# Patient Record
Sex: FEMALE | Race: WHITE | HISPANIC OR LATINO | Employment: OTHER | URBAN - METROPOLITAN AREA
[De-identification: names, ages, dates, MRNs, and addresses within clinical notes are randomized per-mention and may not be internally consistent; named-entity substitution may affect disease eponyms.]

---

## 2017-03-06 ENCOUNTER — OFFICE VISIT (OUTPATIENT)
Dept: CARDIOLOGY | Facility: MEDICAL CENTER | Age: 47
End: 2017-03-06
Payer: COMMERCIAL

## 2017-03-06 VITALS
HEART RATE: 94 BPM | OXYGEN SATURATION: 98 % | WEIGHT: 246 LBS | HEIGHT: 69 IN | DIASTOLIC BLOOD PRESSURE: 82 MMHG | BODY MASS INDEX: 36.43 KG/M2 | SYSTOLIC BLOOD PRESSURE: 132 MMHG

## 2017-03-06 DIAGNOSIS — R06.09 DOE (DYSPNEA ON EXERTION): ICD-10-CM

## 2017-03-06 DIAGNOSIS — R00.0 HEART RATE FAST: ICD-10-CM

## 2017-03-06 DIAGNOSIS — R73.9 HYPERGLYCEMIA: ICD-10-CM

## 2017-03-06 DIAGNOSIS — I25.2 OLD MI (MYOCARDIAL INFARCTION): ICD-10-CM

## 2017-03-06 DIAGNOSIS — E66.9 OBESITY (BMI 35.0-39.9 WITHOUT COMORBIDITY): ICD-10-CM

## 2017-03-06 LAB — EKG IMPRESSION: NORMAL

## 2017-03-06 PROCEDURE — 93000 ELECTROCARDIOGRAM COMPLETE: CPT | Performed by: INTERNAL MEDICINE

## 2017-03-06 PROCEDURE — 99204 OFFICE O/P NEW MOD 45 MIN: CPT | Performed by: INTERNAL MEDICINE

## 2017-03-06 NOTE — PROGRESS NOTES
Cardiology Consult Note:    Alex King  Date & Time note created:    3/6/2017   2:21 PM       Patient ID:  Name:             Jyoti Gutierrez   YOB: 1970  Age:                 46 y.o.  female   MRN:               [id number]                                                             Chief Complaint: No chief complaint on file.            History of Present Illness:   Jyoti Gutierrez has recent chest tightness with fast heart rate with dyspnea for the last month; She has a MI in 12/7/2016; . Lasts briefly provoked and at rest; Decreased Energy and stamina    Review of Systems:     Constitutional: Denies fevers, Denies weight changes  Eyes: Denies changes in vision, no eye pain  Ears/Nose/Throat/Mouth: Denies nasal congestion or sore throat   Cardiovascular: Denies chest pain or palpitations   Respiratory: Denies shortness of breath , Denies cough  Gastrointestinal/Hepatic: Denies abdominal pain, nausea, vomiting, diarrhea, constipation or GI bleeding   Genitourinary: Denies bladder dysfunction, dysuria or frequency  Musculoskeletal/Rheum: Denies  joint pain and swelling   Skin/Breast: Denies rash, denies breast lumps or discharge  Neurological: Denies headache, confusion, memory loss or focal weakness/parasthesias  Psychiatric: denies mood disorder   Endocrine: denies hx of diabetes but thyroid dysfunction  Heme/Oncology/Lymph Nodes: Denies enlarged lymph nodes, denies bruising or known bleeding disorder  Allergic/Immunologic: Denies hx of allergies      All other systems were reviewed and are negative (AMA/CMS criteria)    Past Medical History:   Past Medical History   Diagnosis Date   • Hypertension          Past Surgical History:  History reviewed. No pertinent past surgical history.    Hospital Medications:    Current outpatient prescriptions:   •  LISINOPRIL-HYDROCHLOROTHIAZIDE PO, Take  by mouth., Disp: , Rfl:   •  hydrocodone-acetaminophen (NORCO) 7.5-325 MG per tablet, Take 1-2 Tabs by  mouth every 6 hours as needed., Disp: 20 Tab, Rfl: 0  •  cyclobenzaprine (FLEXERIL) 10 MG Tab, Take 1 Tab by mouth 3 times a day as needed for Muscle Spasms., Disp: 20 Tab, Rfl: 0  •  azithromycin (ZITHROMAX) 250 MG Tab, 2 tabs by mouth day 1, 1 tab by mouth days 2-5, Disp: 6 Tab, Rfl: 0  •  hydrocodone-acetaminophen (NORCO) 5-325 MG Tab per tablet, Take 1 Tab by mouth every 6 hours as needed., Disp: 20 Tab, Rfl: 0    Current Outpatient Medications:  Current Outpatient Prescriptions   Medication Sig Dispense Refill   • LISINOPRIL-HYDROCHLOROTHIAZIDE PO Take  by mouth.     • hydrocodone-acetaminophen (NORCO) 7.5-325 MG per tablet Take 1-2 Tabs by mouth every 6 hours as needed. 20 Tab 0   • cyclobenzaprine (FLEXERIL) 10 MG Tab Take 1 Tab by mouth 3 times a day as needed for Muscle Spasms. 20 Tab 0   • azithromycin (ZITHROMAX) 250 MG Tab 2 tabs by mouth day 1, 1 tab by mouth days 2-5 6 Tab 0   • hydrocodone-acetaminophen (NORCO) 5-325 MG Tab per tablet Take 1 Tab by mouth every 6 hours as needed. 20 Tab 0     No current facility-administered medications for this visit.         Medication Allergy/Sensitivities:  Allergies   Allergen Reactions   • Penicillins        Family History:  Family History   Problem Relation Age of Onset   • Stroke Maternal Grandfather    • Heart Disease Paternal Grandmother    • Stroke Paternal Grandfather        Social History:  Social History     Social History   • Marital Status:      Spouse Name: N/A   • Number of Children: N/A   • Years of Education: N/A     Occupational History   • Not on file.     Social History Main Topics   • Smoking status: Never Smoker    • Smokeless tobacco: Not on file   • Alcohol Use: Yes      Comment: occasional   • Drug Use: No   • Sexual Activity: Not on file     Other Topics Concern   • Not on file     Social History Narrative         Physical Exam:  Vitals  Weight/BMI: Body mass index is 36.31 kg/(m^2).  Blood pressure 132/82, pulse 94, height 1.753 m  "(5' 9.02\"), weight 111.585 kg (246 lb), SpO2 98 %.  Filed Vitals:    03/06/17 1351   BP: 132/82   Pulse: 94   Height: 1.753 m (5' 9.02\")   Weight: 111.585 kg (246 lb)   SpO2: 98%     Oxygen Therapy:  Pulse Oximetry: 98 %  General Appearance:  Obese; Well developed, Well nourished, No acute distress, Non-toxic appearance.   HENT:  Normocephalic, Atraumatic, Oropharynx moist mucous membranes, Dentition: Mallampati 3 OP, Nose normal.    Eyes:  PERRLA, EOMI, Conjunctiva normal, No discharge.  Neck:  Normal range of motion, No cervical tenderness, Supple, No stridor, no JVD .  No thyromegaly.  No carotid bruit.  Cardiovascular:  Normal heart rate, Normal rhythm,  S1, S2, no S3,  S4; No gallops; No murmurs, No rubs, .   Extremitites with intact distal pulses, no cyanosis, clubbing or edema.  No heaves, thrills, HJR;  Peripheral pulses: carotid 2+, brachial 2+, radial 2+, ulnar 2+, femoral 2+, popliteal 2+, PT 2+, DP 2+;  Lungs:  Respiratory effort is normal. Normal breath sounds, breath sounds clear to auscultation bilaterally,  no rales, no rhonchi, no wheezing.   Abdomen: Bowel sounds normal, Soft, No tenderness, No guarding, No rebound, No masses, No hepatosplenomegaly.  Skin: Warm, Dry, No erythema, No rash, no induration or crepitus.  Neurologic: Alert & oriented x 3, Normal motor function, Normal sensory function, No focal deficits noted, cranial nerves II through XII are normal,  normal gait.  Psychiatric: Affect normal, Judgment normal, Mood normal.      Data Review:     Records reviewed and summarized in current documentation    Lab Data Review:  Lab Results   Component Value Date/Time    SODIUM 133* 10/11/2016 07:50 PM    POTASSIUM 3.8 10/11/2016 07:50 PM    CHLORIDE 100 10/11/2016 07:50 PM    CO2 24 10/11/2016 07:50 PM    GLUCOSE 105* 10/11/2016 07:50 PM    BUN 11 10/11/2016 07:50 PM    CREATININE 0.75 10/11/2016 07:50 PM      No results found for: PROTHROMBTM, INR   Lab Results   Component Value Date/Time    " WBC 13.3* 10/11/2016 07:50 PM    RBC 4.43 10/11/2016 07:50 PM    HEMOGLOBIN 13.9 10/11/2016 07:50 PM    HEMATOCRIT 40.0 10/11/2016 07:50 PM    MCV 90.3 10/11/2016 07:50 PM    MCH 31.4 10/11/2016 07:50 PM    MCHC 34.8 10/11/2016 07:50 PM    MPV 10.5 10/11/2016 07:50 PM    NEUTROPHILS-POLYS 77.50* 10/11/2016 07:50 PM    LYMPHOCYTES 15.30* 10/11/2016 07:50 PM    MONOCYTES 5.60 10/11/2016 07:50 PM    EOSINOPHILS 0.80 10/11/2016 07:50 PM    BASOPHILS 0.20 10/11/2016 07:50 PM        Imaging/Procedures Review:    To my review shows: na    EKG To my review shows: SR 85 bpm, Borderline ant t abn    Assessment and Plan.   46 y.o. female has recent MI and tachycardiac related CP/tightness and dyspnea; Not sure if her panic attck but very concerned about if her heart? Unable to tolerate meds, Statin and BB has been discontinues; Pls see orders    1. Old MI (myocardial infarction)    - EKG  - ECHOCARDIOGRAM COMP W/O CONT; Future  - NM-CARDIAC STRESS TEST; Future  - LIPID PANEL    2. CHERY (dyspnea on exertion)    - ECHOCARDIOGRAM COMP W/O CONT; Future  - NM-CARDIAC STRESS TEST; Future  - HOLTER MONITOR STUDY; Future    3. Heart rate fast    - HOLTER MONITOR STUDY; Future      1. Old MI (myocardial infarction)  EKG    ECHOCARDIOGRAM COMP W/O CONT    NM-CARDIAC STRESS TEST    LIPID PANEL   2. CHERY (dyspnea on exertion)  ECHOCARDIOGRAM COMP W/O CONT    NM-CARDIAC STRESS TEST    HOLTER MONITOR STUDY   3. Heart rate fast  HOLTER MONITOR STUDY    TSH    MAGNESIUM   4. Hyperglycemia  HEMOGLOBIN A1C   5. Obesity (BMI 35.0-39.9 without comorbidity) (HCC)

## 2017-03-06 NOTE — Clinical Note
[facility]   [address]  [place] [address]  Phone: [phone]  Fax: [phone]              Jyoti Gutierrez  1970    Encounter Date: 3/6/2017    Lee Jacinto M.D.    Thank you for the referral. I had the pleasure of seeing Jyoti Gutierrez today in cardiology clinic. I've attached my visit note below. If you have any questions please feel free to give me a call anytime.      Alex King MD, PhD, FACC  Cardiology and Lipidology  [facility]                                                                  PROGRESS NOTE:  Cardiology Consult Note:    Alex King  Date & Time note created:    3/6/2017   2:21 PM       Patient ID:  Name:             Jyoti Gutierrez   YOB: 1970  Age:                 46 y.o.  female   MRN:               [id number]                                                             Chief Complaint: No chief complaint on file.            History of Present Illness:   Jyoti Gutierrez has recent chest tightness with fast heart rate with dyspnea for the last month; She has a MI in 12/7/2016; . Lasts briefly provoked and at rest; Decreased Energy and stamina    Review of Systems:     Constitutional: Denies fevers, Denies weight changes  Eyes: Denies changes in vision, no eye pain  Ears/Nose/Throat/Mouth: Denies nasal congestion or sore throat   Cardiovascular: Denies chest pain or palpitations   Respiratory: Denies shortness of breath , Denies cough  Gastrointestinal/Hepatic: Denies abdominal pain, nausea, vomiting, diarrhea, constipation or GI bleeding   Genitourinary: Denies bladder dysfunction, dysuria or frequency  Musculoskeletal/Rheum: Denies  joint pain and swelling   Skin/Breast: Denies rash, denies breast lumps or discharge  Neurological: Denies headache, confusion, memory loss or focal weakness/parasthesias  Psychiatric: denies mood disorder      Endocrine: denies hx of diabetes but thyroid dysfunction  Heme/Oncology/Lymph Nodes: Denies enlarged lymph nodes, denies bruising or known bleeding disorder  Allergic/Immunologic: Denies hx of allergies      All other systems were reviewed and are negative (AMA/CMS criteria)    Past Medical History:   Past Medical History   Diagnosis Date   • Hypertension          Past Surgical History:  History reviewed. No pertinent past surgical history.    Hospital Medications:    Current outpatient prescriptions:   •  LISINOPRIL-HYDROCHLOROTHIAZIDE PO, Take  by mouth., Disp: , Rfl:   •  hydrocodone-acetaminophen (NORCO) 7.5-325 MG per tablet, Take 1-2 Tabs by mouth every 6 hours as needed., Disp: 20 Tab, Rfl: 0  •  cyclobenzaprine (FLEXERIL) 10 MG Tab, Take 1 Tab by mouth 3 times a day as needed for Muscle Spasms., Disp: 20 Tab, Rfl: 0  •  azithromycin (ZITHROMAX) 250 MG Tab, 2 tabs by mouth day 1, 1 tab by mouth days 2-5, Disp: 6 Tab, Rfl: 0  •  hydrocodone-acetaminophen (NORCO) 5-325 MG Tab per tablet, Take 1 Tab by mouth every 6 hours as needed., Disp: 20 Tab, Rfl: 0    Current Outpatient Medications:  Current Outpatient Prescriptions   Medication Sig Dispense Refill   • LISINOPRIL-HYDROCHLOROTHIAZIDE PO Take  by mouth.     • hydrocodone-acetaminophen (NORCO) 7.5-325 MG per tablet Take 1-2 Tabs by mouth every 6 hours as needed. 20 Tab 0   • cyclobenzaprine (FLEXERIL) 10 MG Tab Take 1 Tab by mouth 3 times a day as needed for Muscle Spasms. 20 Tab 0   • azithromycin (ZITHROMAX) 250 MG Tab 2 tabs by mouth day 1, 1 tab by mouth days 2-5 6 Tab 0   • hydrocodone-acetaminophen (NORCO) 5-325 MG Tab per tablet Take 1 Tab by mouth every 6 hours as needed. 20 Tab 0     No current facility-administered medications for this visit.         Medication Allergy/Sensitivities:  Allergies   Allergen Reactions   • Penicillins        Family History:  Family History   Problem Relation Age of Onset   • Stroke Maternal Grandfather    • Heart  "Disease Paternal Grandmother    • Stroke Paternal Grandfather        Social History:  Social History     Social History   • Marital Status:      Spouse Name: N/A   • Number of Children: N/A   • Years of Education: N/A     Occupational History   • Not on file.     Social History Main Topics   • Smoking status: Never Smoker    • Smokeless tobacco: Not on file   • Alcohol Use: Yes      Comment: occasional   • Drug Use: No   • Sexual Activity: Not on file     Other Topics Concern   • Not on file     Social History Narrative         Physical Exam:  Vitals  Weight/BMI: Body mass index is 36.31 kg/(m^2).  Blood pressure 132/82, pulse 94, height 1.753 m (5' 9.02\"), weight 111.585 kg (246 lb), SpO2 98 %.  Filed Vitals:    03/06/17 1351   BP: 132/82   Pulse: 94   Height: 1.753 m (5' 9.02\")   Weight: 111.585 kg (246 lb)   SpO2: 98%     Oxygen Therapy:  Pulse Oximetry: 98 %  General Appearance:  Obese; Well developed, Well nourished, No acute distress, Non-toxic appearance.   HENT:  Normocephalic, Atraumatic, Oropharynx moist mucous membranes, Dentition: Mallampati 3 OP, Nose normal.    Eyes:  PERRLA, EOMI, Conjunctiva normal, No discharge.  Neck:  Normal range of motion, No cervical tenderness, Supple, No stridor, no JVD .  No thyromegaly.  No carotid bruit.  Cardiovascular:  Normal heart rate, Normal rhythm,  S1, S2, no S3,  S4; No gallops; No murmurs, No rubs, .   Extremitites with intact distal pulses, no cyanosis, clubbing or edema.  No heaves, thrills, HJR;  Peripheral pulses: carotid 2+, brachial 2+, radial 2+, ulnar 2+, femoral 2+, popliteal 2+, PT 2+, DP 2+;  Lungs:  Respiratory effort is normal. Normal breath sounds, breath sounds clear to auscultation bilaterally,  no rales, no rhonchi, no wheezing.   Abdomen: Bowel sounds normal, Soft, No tenderness, No guarding, No rebound, No masses, No hepatosplenomegaly.  Skin: Warm, Dry, No erythema, No rash, no induration or crepitus.  Neurologic: Alert & oriented x 3, " Normal motor function, Normal sensory function, No focal deficits noted, cranial nerves II through XII are normal,  normal gait.  Psychiatric: Affect normal, Judgment normal, Mood normal.      Data Review:     Records reviewed and summarized in current documentation    Lab Data Review:  Lab Results   Component Value Date/Time    SODIUM 133* 10/11/2016 07:50 PM    POTASSIUM 3.8 10/11/2016 07:50 PM    CHLORIDE 100 10/11/2016 07:50 PM    CO2 24 10/11/2016 07:50 PM    GLUCOSE 105* 10/11/2016 07:50 PM    BUN 11 10/11/2016 07:50 PM    CREATININE 0.75 10/11/2016 07:50 PM      No results found for: PROTHROMBTM, INR   Lab Results   Component Value Date/Time    WBC 13.3* 10/11/2016 07:50 PM    RBC 4.43 10/11/2016 07:50 PM    HEMOGLOBIN 13.9 10/11/2016 07:50 PM    HEMATOCRIT 40.0 10/11/2016 07:50 PM    MCV 90.3 10/11/2016 07:50 PM    MCH 31.4 10/11/2016 07:50 PM    MCHC 34.8 10/11/2016 07:50 PM    MPV 10.5 10/11/2016 07:50 PM    NEUTROPHILS-POLYS 77.50* 10/11/2016 07:50 PM    LYMPHOCYTES 15.30* 10/11/2016 07:50 PM    MONOCYTES 5.60 10/11/2016 07:50 PM    EOSINOPHILS 0.80 10/11/2016 07:50 PM    BASOPHILS 0.20 10/11/2016 07:50 PM        Imaging/Procedures Review:    To my review shows: na    EKG To my review shows: SR 85 bpm, Borderline ant t abn    Assessment and Plan.   46 y.o. female has recent MI and tachycardiac related CP/tightness and dyspnea; Not sure if her panic attck but very concerned about if her heart? Unable to tolerate meds, Statin and BB has been discontinues; Pls see orders    1. Old MI (myocardial infarction)    - EKG  - ECHOCARDIOGRAM COMP W/O CONT; Future  - NM-CARDIAC STRESS TEST; Future  - LIPID PANEL    2. CHERY (dyspnea on exertion)    - ECHOCARDIOGRAM COMP W/O CONT; Future  - NM-CARDIAC STRESS TEST; Future  - HOLTER MONITOR STUDY; Future    3. Heart rate fast    - HOLTER MONITOR STUDY; Future      1. Old MI (myocardial infarction)  EKG    ECHOCARDIOGRAM COMP W/O CONT    NM-CARDIAC STRESS TEST    LIPID  PANEL   2. CHERY (dyspnea on exertion)  ECHOCARDIOGRAM COMP W/O CONT    NM-CARDIAC STRESS TEST    HOLTER MONITOR STUDY   3. Heart rate fast  HOLTER MONITOR STUDY    TSH    MAGNESIUM   4. Hyperglycemia  HEMOGLOBIN A1C   5. Obesity (BMI 35.0-39.9 without comorbidity) ([facility])           Lee Jacinto M.D.  [address] [address]  VIA Facsimile: [phone]

## 2017-03-06 NOTE — MR AVS SNAPSHOT
"        Jyoti Gutierrez   3/6/2017 1:45 PM   Office Visit   MRN: [id number]    Department:  [facility]   Dept Phone:  [phone]    Description:  Female : 1970   Provider:  Alex King MD,[facility]           Allergies as of 3/6/2017     Allergen Noted Reactions    Penicillins 10/09/2016         You were diagnosed with     Old MI (myocardial infarction)   [866415]       CHERY (dyspnea on exertion)   [877139]       Heart rate fast   [528479]       Hyperglycemia   [325666]       Obesity (BMI 35.0-39.9 without comorbidity) ([facility])   [301155]         Vital Signs     Blood Pressure Pulse Height Weight Body Mass Index Oxygen Saturation    132/82 mmHg 94 1.753 m (5' 9.02\") 111.585 kg (246 lb) 36.31 kg/m2 98%    Smoking Status                   Never Smoker            Basic Information     Date Of Birth Sex Race Ethnicity Preferred Language    1970 Female White  Origin ([place],Citizen of [place] citizen, etc) English      Your appointments     Mar 09, 2017  8:45 AM   NM CARDIAC STRESS TEST (30) with [facility] NM DSPECT 2   [facility] IMAGING [facility] ([facility]    [address]  [place] [address] [phone]           NPO for 4 hours prior to scan.  No caffeine for 24 hours prior to test (decaf, coffee, cola, tea, chocolate, Excedrin, and Anacin).  No Viagra or other ED meds for 48 hours.  Warn patient of length of test and to bring list of meds.            Mar 29, 2017  9:15 AM   ECHO with ECHO [facility]   ECHOCARDIOLOGY [facility] ([facility])    [address]  [place] [address]   [phone]           No prep            2017  9:45 AM   FO[facility] King [facility]    [address]  [place] [id number] [phone]              Health Maintenance        Date Due Completion Dates    IMM DTaP/Tdap/Td Vaccine (1 - Tdap) 1991 ---    PAP SMEAR 1993 ---    MAMMOGRAM " 7/27/2012 ---    IMM INFLUENZA (1) 9/1/2016 ---            Results       Current Immunizations     No immunizations on file.      Below and/or attached are the medications your provider expects you to take. Review all of your home medications and newly ordered medications with your provider and/or pharmacist. Follow medication instructions as directed by your provider and/or pharmacist. Please keep your medication list with you and share with your provider. Update the information when medications are discontinued, doses are changed, or new medications (including over-the-counter products) are added; and carry medication information at all times in the event of emergency situations     Allergies:  PENICILLINS - (reactions not documented)               Medications  Valid as of: March 06, 2017 -  2:41 PM    Generic Name Brand Name Tablet Size Instructions for use    Azithromycin (Tab) ZITHROMAX 250 MG 2 tabs by mouth day 1, 1 tab by mouth days 2-5        Cyclobenzaprine HCl (Tab) FLEXERIL 10 MG Take 1 Tab by mouth 3 times a day as needed for Muscle Spasms.        Hydrocodone-Acetaminophen (Tab) NORCO 5-325 MG Take 1 Tab by mouth every 6 hours as needed.        Hydrocodone-Acetaminophen (Tab) NORCO 7.5-325 MG Take 1-2 Tabs by mouth every 6 hours as needed.        Lisinopril-Hydrochlorothiazide   Take  by mouth.        .                 Medicines prescribed today were sent to:     [facility] #[id number] - [place] - [address]    [address] [place] [address] [place] [address]    Phone: [phone] Fax: [phone]    Open 24 Hours?: No      Medication refill instructions:       If your prescription bottle indicates you have medication refills left, it is not necessary to call your provider’s office. Please contact your pharmacy and they will refill your medication.    If your prescription bottle indicates you do not have any refills left, you may request refills at any time through one of the following ways: The online [employer]  system (except Urgent Care), by calling your provider’s office, or by asking your pharmacy to contact your provider’s office with a refill request. Medication refills are processed only during regular business hours and may not be available until the next business day. Your provider may request additional information or to have a follow-up visit with you prior to refilling your medication.   *Please Note: Medication refills are assigned a new Rx number when refilled electronically. Your pharmacy may indicate that no refills were authorized even though a new prescription for the same medication is available at the pharmacy. Please request the medicine by name with the pharmacy before contacting your provider for a refill.        Your To Do List     Future Labs/Procedures Complete By Expires    ECHOCARDIOGRAM COMP W/O CONT  As directed 3/6/2018    HEMOGLOBIN A1C  As directed 3/6/2018    HOLTER MONITOR STUDY  As directed 3/6/2018    MAGNESIUM  As directed 3/6/2018    NM-CARDIAC STRESS TEST  As directed 3/6/2018    TSH  As directed 3/6/2018         [employer] Access Code: [email or web address]  Expires: 3/24/2017 12:24 PM    [employer]  A secure, online tool to manage your health information     [employer]® is a secure, online tool that connects you to your personalized health information from the privacy of your home -- day or night - making it very easy for you to manage your healthcare. Once the activation process is completed, you can even access your medical information using the [employer] tacos, which is available for free in the [employer] Tacos store or Google Play store.     [employer] provides the following levels of access (as shown below):   My Chart Features   [facility] Doctor [facility]  Specialists [facility]  Urgent  Care Non-[facility]  Primary Care  Doctor   Email your healthcare team securely and privately 24/7 X X X    Manage appointments: schedule your next appointment; view details of past/upcoming appointments  X      Request prescription refills. X      View recent personal medical records, including lab and immunizations X X X X   View health record, including health history, allergies, medications X X X X   Read reports about your outpatient visits, procedures, consult and ER notes X X X X   See your discharge summary, which is a recap of your hospital and/or ER visit that includes your diagnosis, lab results, and care plan. X X       How to register for [employer]:  1. Go to  [email or web address].  2. Click on the Sign Up Now box, which takes you to the New Member Sign Up page. You will need to provide the following information:  a. Enter your [employer] Access Code exactly as it appears at the top of this page. (You will not need to use this code after you’ve completed the sign-up process. If you do not sign up before the expiration date, you must request a new code.)   b. Enter your date of birth.   c. Enter your home email address.   d. Click Submit, and follow the next screen’s instructions.  3. Create a [employer] ID. This will be your [employer] login ID and cannot be changed, so think of one that is secure and easy to remember.  4. Create a [employer] password. You can change your password at any time.  5. Enter your Password Reset Question and Answer. This can be used at a later time if you forget your password.   6. Enter your e-mail address. This allows you to receive e-mail notifications when new information is available in [employer].  7. Click Sign Up. You can now view your health information.    For assistance activating your [employer] account, call [phone]

## 2017-03-09 ENCOUNTER — APPOINTMENT (OUTPATIENT)
Dept: RADIOLOGY | Facility: MEDICAL CENTER | Age: 47
End: 2017-03-09
Attending: INTERNAL MEDICINE
Payer: COMMERCIAL

## 2017-03-13 ENCOUNTER — HOSPITAL ENCOUNTER (OUTPATIENT)
Dept: RADIOLOGY | Facility: MEDICAL CENTER | Age: 47
End: 2017-03-13
Attending: INTERNAL MEDICINE
Payer: COMMERCIAL

## 2017-03-13 DIAGNOSIS — I25.2 OLD MI (MYOCARDIAL INFARCTION): ICD-10-CM

## 2017-03-13 DIAGNOSIS — R06.09 DOE (DYSPNEA ON EXERTION): ICD-10-CM

## 2017-03-13 NOTE — PROGRESS NOTES
Patient in [facility] for MPI. Says she had MPI @ [facility] in December.  Report obtained from [facility].  Negative test.  Reviewed by Dt To.  MPI canceled.

## 2017-03-17 ENCOUNTER — HOSPITAL ENCOUNTER (OUTPATIENT)
Dept: LAB | Facility: MEDICAL CENTER | Age: 47
End: 2017-03-17
Attending: INTERNAL MEDICINE
Payer: COMMERCIAL

## 2017-03-17 DIAGNOSIS — R00.0 HEART RATE FAST: ICD-10-CM

## 2017-03-17 DIAGNOSIS — R73.9 HYPERGLYCEMIA: ICD-10-CM

## 2017-03-17 LAB
CHOLEST SERPL-MCNC: 161 MG/DL (ref 100–199)
EST. AVERAGE GLUCOSE BLD GHB EST-MCNC: 114 MG/DL
HBA1C MFR BLD: 5.6 % (ref 0–5.6)
HDLC SERPL-MCNC: 39 MG/DL
LDLC SERPL CALC-MCNC: 85 MG/DL
MAGNESIUM SERPL-MCNC: 1.9 MG/DL (ref 1.5–2.5)
TRIGL SERPL-MCNC: 185 MG/DL (ref 0–149)
TSH SERPL DL<=0.005 MIU/L-ACNC: 1.48 UIU/ML (ref 0.3–3.7)

## 2017-03-17 PROCEDURE — 84443 ASSAY THYROID STIM HORMONE: CPT

## 2017-03-17 PROCEDURE — 83036 HEMOGLOBIN GLYCOSYLATED A1C: CPT

## 2017-03-17 PROCEDURE — 36415 COLL VENOUS BLD VENIPUNCTURE: CPT

## 2017-03-17 PROCEDURE — 80061 LIPID PANEL: CPT

## 2017-03-17 PROCEDURE — 83735 ASSAY OF MAGNESIUM: CPT

## 2017-04-25 ENCOUNTER — OFFICE VISIT (OUTPATIENT)
Dept: CARDIOLOGY | Facility: MEDICAL CENTER | Age: 47
End: 2017-04-25
Payer: COMMERCIAL

## 2017-04-25 ENCOUNTER — TELEPHONE (OUTPATIENT)
Dept: CARDIOLOGY | Facility: MEDICAL CENTER | Age: 47
End: 2017-04-25

## 2017-04-25 VITALS
DIASTOLIC BLOOD PRESSURE: 88 MMHG | WEIGHT: 242 LBS | HEIGHT: 69 IN | HEART RATE: 74 BPM | OXYGEN SATURATION: 97 % | SYSTOLIC BLOOD PRESSURE: 140 MMHG | BODY MASS INDEX: 35.84 KG/M2

## 2017-04-25 DIAGNOSIS — I10 ESSENTIAL HYPERTENSION: ICD-10-CM

## 2017-04-25 DIAGNOSIS — R00.2 PALPITATIONS: Primary | ICD-10-CM

## 2017-04-25 DIAGNOSIS — I25.2 OLD MI (MYOCARDIAL INFARCTION): ICD-10-CM

## 2017-04-25 DIAGNOSIS — E87.6 HYPOKALEMIA: ICD-10-CM

## 2017-04-25 PROCEDURE — 99214 OFFICE O/P EST MOD 30 MIN: CPT | Performed by: NURSE PRACTITIONER

## 2017-04-25 RX ORDER — LISINOPRIL 20 MG/1
20 TABLET ORAL DAILY
Qty: 90 TAB | Refills: 3 | Status: SHIPPED | OUTPATIENT
Start: 2017-04-25 | End: 2017-05-24

## 2017-04-25 RX ORDER — POTASSIUM CHLORIDE 20 MEQ/1
TABLET, EXTENDED RELEASE ORAL
Refills: 0 | COMMUNITY
Start: 2017-04-18 | End: 2017-05-24

## 2017-04-25 RX ORDER — LISINOPRIL 20 MG/1
20 TABLET ORAL DAILY
COMMUNITY
End: 2017-04-25 | Stop reason: SDUPTHER

## 2017-04-25 ASSESSMENT — ENCOUNTER SYMPTOMS
ABDOMINAL PAIN: 0
NERVOUS/ANXIOUS: 1
PALPITATIONS: 1
CLAUDICATION: 0
ORTHOPNEA: 0
DIZZINESS: 0
WEAKNESS: 0
MYALGIAS: 0
SHORTNESS OF BREATH: 0
PND: 0
COUGH: 0

## 2017-04-25 NOTE — TELEPHONE ENCOUNTER
Patient informed per CINTHYA PENNINGTON to please monitor her BP q am and pm and bring record to next [facility] as well as her monitor.  She states she had her monitor checked at her PCP's office, but will bring it in.

## 2017-04-25 NOTE — Clinical Note
[facility]   [address]  [place] [address]  Phone: [phone]  Fax: [phone]              Jyoti Gutierrez  1970    Encounter Date: 4/25/2017    SAURAV Plascencia          PROGRESS NOTE:  Subjective:   Jyoti Gutierrez is a 46 y.o. female who presents today to follow-up on a previous MI. She tells me that she presented to [facility] in December where she had fast heart beat and felt as though she was having a heart attack. She was admitted for 2 days and testing included MPI, echocardiogram and blood work. She was told that she had a heart attack and discharged home.    She states the treatment plan was developed with the cardiologist was changed prior to her discharge and she lost confidence in the cardiologist. She went to her primary care, Dr. Lee Jacinto who referred her to cardiology but this referral was misplaced. She followed up with Dr. King in March who ordered testing to include MPI, echocardiogram and Holter monitor. She is here for follow-up.    She went for the MPI was told that she could not have this as she had it in December at [facility] and it was normal. Therefore the test was canceled. The echocardiogram would've cost her $400 out-of-pocket therefore she declined to do it since she had one in December. It appears the Holter monitor was not ordered.    After hospitalization in December she has had 3 episodes where her heart has been beating fast. Since seen Dr. King last month, she has only had one episode that lasted only a few minutes. When this occurs she feels her heart pounding fast and hard. She has some minor upper substernal chest discomfort with this. It occurred at rest.    She denies any chest tightness, heaviness or pressure. No shortness of breath. She is able to walk briskly without any discomfort. She does complain of low energy. She admits to increased  amount of stress as she is  her  who was cheating on her for the entire time they were . She becomes tearful when she discusses this.    She tells me that her insurance will and in approximately 2 months.      Past Medical History   Diagnosis Date   • Hypertension      History reviewed. No pertinent past surgical history.  Family History   Problem Relation Age of Onset   • Stroke Maternal Grandfather    • Heart Disease Paternal Grandmother    • Stroke Paternal Grandfather    • Other Mother 55     Breast CA     History   Smoking status   • Never Smoker    Smokeless tobacco   • Never Used     Allergies   Allergen Reactions   • Penicillins Rash     Rash     Outpatient Encounter Prescriptions as of 4/25/2017   Medication Sig Dispense Refill   • potassium chloride SA (KDUR) 20 MEQ Tab CR TAKE 1 TABLET BY MOUTH TWICE A DAY  0   • lisinopril (PRINIVIL) 20 MG Tab Take 1 Tab by mouth every day. 90 Tab 3   • [DISCONTINUED] lisinopril (PRINIVIL) 20 MG Tab Take 20 mg by mouth every day.     • [DISCONTINUED] hydrocodone-acetaminophen (NORCO) 7.5-325 MG per tablet Take 1-2 Tabs by mouth every 6 hours as needed. (Patient not taking: Reported on 4/25/2017) 20 Tab 0   • [DISCONTINUED] cyclobenzaprine (FLEXERIL) 10 MG Tab Take 1 Tab by mouth 3 times a day as needed for Muscle Spasms. (Patient not taking: Reported on 4/25/2017) 20 Tab 0   • [DISCONTINUED] azithromycin (ZITHROMAX) 250 MG Tab 2 tabs by mouth day 1, 1 tab by mouth days 2-5 (Patient not taking: Reported on 4/25/2017) 6 Tab 0   • [DISCONTINUED] LISINOPRIL-HYDROCHLOROTHIAZIDE PO Take  by mouth.     • [DISCONTINUED] hydrocodone-acetaminophen (NORCO) 5-325 MG Tab per tablet Take 1 Tab by mouth every 6 hours as needed. (Patient not taking: Reported on 4/25/2017) 20 Tab 0     No facility-administered encounter medications on file as of 4/25/2017.     Review of Systems   Constitutional: Negative for malaise/fatigue.   Respiratory: Negative for cough and  "shortness of breath.    Cardiovascular: Positive for palpitations (heart pounding fast.). Negative for chest pain, orthopnea, claudication, leg swelling and PND.   Gastrointestinal: Negative for abdominal pain.   Musculoskeletal: Negative for myalgias.   Neurological: Negative for dizziness and weakness.   Psychiatric/Behavioral: The patient is nervous/anxious.         Objective:   /88 mmHg  Pulse 74  Ht 1.753 m (5' 9.02\")  Wt 109.77 kg (242 lb)  BMI 35.72 kg/m2  SpO2 97%    Physical Exam   Constitutional: She is oriented to person, place, and time. She appears well-developed and well-nourished.   HENT:   Head: Normocephalic.   Eyes: Conjunctivae are normal.   Neck: No JVD present. No thyromegaly present.   Cardiovascular: Normal rate, regular rhythm, S1 normal, S2 normal and normal heart sounds.  Exam reveals no gallop, no S3, no S4 and no friction rub.    No murmur heard.  Pulmonary/Chest: Effort normal and breath sounds normal. No respiratory distress. She has no wheezes. She has no rales.   Abdominal: Soft. Bowel sounds are normal. She exhibits no distension. There is no tenderness.   Musculoskeletal: She exhibits no edema.   Neurological: She is alert and oriented to person, place, and time.   Skin: Skin is warm and dry.   Psychiatric: She has a normal mood and affect.     Results for JYOTI ENCISO (MRN [id number]) as of 4/25/2017 10:37   Ref. Range 3/17/2017 09:50   Magnesium Latest Ref Range: 1.5-2.5 mg/dL 1.9   Glycohemoglobin Latest Ref Range: 0.0-5.6 % 5.6   Estim. Avg Glu Latest Units: mg/dL 114   Cholesterol,Tot Latest Ref Range: 100-199 mg/dL 161   Triglycerides Latest Ref Range: 0-149 mg/dL 185 (H)   HDL Latest Ref Range: >=40 mg/dL 39 (A)   LDL Latest Ref Range: <100 mg/dL 85   TSH Latest Ref Range: 0.300-3.700 uIU/mL 1.480     Assessment:     1. Palpitations  HOLTER MONITOR STUDY   2. Old MI (myocardial infarction)     3. Essential hypertension  lisinopril (PRINIVIL) 20 MG Tab   4. " Hypokalemia  BASIC METABOLIC PANEL       Medical Decision Making:  Today's Assessment / Status / Plan:     Palpitations: She has episodes of a fast heart rate with only one in the last month. This sounds like it may be PAT or SVT. I have discussed with her vagal maneuvers. We'll do Holter monitor to evaluate this.    Old MI: Is unclear whether she truly had a heart attack or not. She may have had Takostubo cardiomyopathy. I requested a full set of records from [facility] so we can evaluate these. The echo and MPI that were ordered probably are not necessary as she is not symptomatic and they were done as recently as December. I will decide when records are available whether she needs further testing.    Hypertension: her blood pressure is elevated. She will monitor her blood pressure morning and evening and bring a record.    Hypokalemia: She was told she has low on potassium by her OB/GYN. He started her on a potassium supplement. I will have her discontinue the potassium and check a basic metabolic panel to see what her potassium level is. She may not need potassium after all particularly since she is on lisinopril which holds potassium.    She will follow-up in approximately one month with myself after her lab and Holter monitor are obtained. I will contact her sooner if there is anything else we need to do after reviewing the records from [facility].    Collaborating Provider: Dr. Kauffman.        No Recipients

## 2017-04-25 NOTE — PROGRESS NOTES
Subjective:   Jyoti Gutierrez is a 46 y.o. female who presents today to follow-up on a previous MI. She tells me that she presented to [facility] in December where she had fast heart beat and felt as though she was having a heart attack. She was admitted for 2 days and testing included MPI, echocardiogram and blood work. She was told that she had a heart attack and discharged home.    She states the treatment plan was developed with the cardiologist was changed prior to her discharge and she lost confidence in the cardiologist. She went to her primary care, Dr. Lee Jacinto who referred her to cardiology but this referral was misplaced. She followed up with Dr. King in March who ordered testing to include MPI, echocardiogram and Holter monitor. She is here for follow-up.    She went for the MPI was told that she could not have this as she had it in December at [facility] and it was normal. Therefore the test was canceled. The echocardiogram would've cost her $400 out-of-pocket therefore she declined to do it since she had one in December. It appears the Holter monitor was not ordered.    After hospitalization in December she has had 3 episodes where her heart has been beating fast. Since seen Dr. King last month, she has only had one episode that lasted only a few minutes. When this occurs she feels her heart pounding fast and hard. She has some minor upper substernal chest discomfort with this. It occurred at rest.    She denies any chest tightness, heaviness or pressure. No shortness of breath. She is able to walk briskly without any discomfort. She does complain of low energy. She admits to increased amount of stress as she is  her  who was cheating on her for the entire time they were . She becomes tearful when she discusses this.    She tells me that her insurance will and in approximately 2 months.      Past Medical History   Diagnosis Date    • Hypertension      History reviewed. No pertinent past surgical history.  Family History   Problem Relation Age of Onset   • Stroke Maternal Grandfather    • Heart Disease Paternal Grandmother    • Stroke Paternal Grandfather    • Other Mother 55     Breast CA     History   Smoking status   • Never Smoker    Smokeless tobacco   • Never Used     Allergies   Allergen Reactions   • Penicillins Rash     Rash     Outpatient Encounter Prescriptions as of 4/25/2017   Medication Sig Dispense Refill   • potassium chloride SA (KDUR) 20 MEQ Tab CR TAKE 1 TABLET BY MOUTH TWICE A DAY  0   • lisinopril (PRINIVIL) 20 MG Tab Take 1 Tab by mouth every day. 90 Tab 3   • [DISCONTINUED] lisinopril (PRINIVIL) 20 MG Tab Take 20 mg by mouth every day.     • [DISCONTINUED] hydrocodone-acetaminophen (NORCO) 7.5-325 MG per tablet Take 1-2 Tabs by mouth every 6 hours as needed. (Patient not taking: Reported on 4/25/2017) 20 Tab 0   • [DISCONTINUED] cyclobenzaprine (FLEXERIL) 10 MG Tab Take 1 Tab by mouth 3 times a day as needed for Muscle Spasms. (Patient not taking: Reported on 4/25/2017) 20 Tab 0   • [DISCONTINUED] azithromycin (ZITHROMAX) 250 MG Tab 2 tabs by mouth day 1, 1 tab by mouth days 2-5 (Patient not taking: Reported on 4/25/2017) 6 Tab 0   • [DISCONTINUED] LISINOPRIL-HYDROCHLOROTHIAZIDE PO Take  by mouth.     • [DISCONTINUED] hydrocodone-acetaminophen (NORCO) 5-325 MG Tab per tablet Take 1 Tab by mouth every 6 hours as needed. (Patient not taking: Reported on 4/25/2017) 20 Tab 0     No facility-administered encounter medications on file as of 4/25/2017.     Review of Systems   Constitutional: Negative for malaise/fatigue.   Respiratory: Negative for cough and shortness of breath.    Cardiovascular: Positive for palpitations (heart pounding fast.). Negative for chest pain, orthopnea, claudication, leg swelling and PND.   Gastrointestinal: Negative for abdominal pain.   Musculoskeletal: Negative for myalgias.   Neurological:  "Negative for dizziness and weakness.   Psychiatric/Behavioral: The patient is nervous/anxious.         Objective:   /88 mmHg  Pulse 74  Ht 1.753 m (5' 9.02\")  Wt 109.77 kg (242 lb)  BMI 35.72 kg/m2  SpO2 97%    Physical Exam   Constitutional: She is oriented to person, place, and time. She appears well-developed and well-nourished.   HENT:   Head: Normocephalic.   Eyes: Conjunctivae are normal.   Neck: No JVD present. No thyromegaly present.   Cardiovascular: Normal rate, regular rhythm, S1 normal, S2 normal and normal heart sounds.  Exam reveals no gallop, no S3, no S4 and no friction rub.    No murmur heard.  Pulmonary/Chest: Effort normal and breath sounds normal. No respiratory distress. She has no wheezes. She has no rales.   Abdominal: Soft. Bowel sounds are normal. She exhibits no distension. There is no tenderness.   Musculoskeletal: She exhibits no edema.   Neurological: She is alert and oriented to person, place, and time.   Skin: Skin is warm and dry.   Psychiatric: She has a normal mood and affect.     Results for JYOTI ENCISO (MRN [id number]) as of 4/25/2017 10:37   Ref. Range 3/17/2017 09:50   Magnesium Latest Ref Range: 1.5-2.5 mg/dL 1.9   Glycohemoglobin Latest Ref Range: 0.0-5.6 % 5.6   Estim. Avg Glu Latest Units: mg/dL 114   Cholesterol,Tot Latest Ref Range: 100-199 mg/dL 161   Triglycerides Latest Ref Range: 0-149 mg/dL 185 (H)   HDL Latest Ref Range: >=40 mg/dL 39 (A)   LDL Latest Ref Range: <100 mg/dL 85   TSH Latest Ref Range: 0.300-3.700 uIU/mL 1.480     Assessment:     1. Palpitations  HOLTER MONITOR STUDY   2. Old MI (myocardial infarction)     3. Essential hypertension  lisinopril (PRINIVIL) 20 MG Tab   4. Hypokalemia  BASIC METABOLIC PANEL       Medical Decision Making:  Today's Assessment / Status / Plan:     Palpitations: She has episodes of a fast heart rate with only one in the last month. This sounds like it may be PAT or SVT. I have discussed with her vagal maneuvers. " We'll do Holter monitor to evaluate this.    Old MI: Is unclear whether she truly had a heart attack or not. She may have had Takostubo cardiomyopathy. I requested a full set of records from [facility] so we can evaluate these. The echo and MPI that were ordered probably are not necessary as she is not symptomatic and they were done as recently as December. I will decide when records are available whether she needs further testing.    Hypertension: her blood pressure is elevated. She will monitor her blood pressure morning and evening and bring a record.    Hypokalemia: She was told she has low on potassium by her OB/GYN. He started her on a potassium supplement. I will have her discontinue the potassium and check a basic metabolic panel to see what her potassium level is. She may not need potassium after all particularly since she is on lisinopril which holds potassium.    She will follow-up in approximately one month with myself after her lab and Holter monitor are obtained. I will contact her sooner if there is anything else we need to do after reviewing the records from [facility].    Collaborating Provider: Dr. Kauffman.

## 2017-04-25 NOTE — MR AVS SNAPSHOT
"        Jyoti Kuhnz   2017 10:00 AM   Office Visit   MRN: [id number]    Department:  [facility]   Dept Phone:  [phone]    Description:  Female : 1970   Provider:  SAURAV Plascencia           Reason for Visit     Follow-Up Here for a recheck, used to see Dr. sullivan 2017. Had a heart attack 2016, took 4 months to see cardiology. Went through a divorce, hardship cost for echocardiogram and had nuclear stress test 2016. Prior cardiology wanted a newer one, but contraindication with timing of it (once a year). Got labs done. Modifying food intake and insurance till 2017.       Allergies as of 2017     Allergen Noted Reactions    Penicillins 10/09/2016   Rash    Rash      You were diagnosed with     Palpitations   [785.1.ICD-9-CM]  -  Primary     Old MI (myocardial infarction)   [659164]       Essential hypertension   [8895076]       Hypokalemia   [533858]         Vital Signs     Blood Pressure Pulse Height Weight Body Mass Index Oxygen Saturation    140/88 mmHg 74 1.753 m (5' 9.02\") 109.77 kg (242 lb) 35.72 kg/m2 97%    Smoking Status                   Never Smoker            Basic Information     Date Of Birth Sex Race Ethnicity Preferred Language    1970 Female White  Origin (Congolese,Jordanian,Montenegrin,English, etc) English      Your appointments     May 04, 2017 11:00 AM   HOLTER MONITOR 48 HRS with [facility]   [facility] (--)    [address], [address]  [place] [id number] [phone]            May 23, 2017 11:00 AM   FOLLOW UP with SAURAV Plascencia   [facility] (--)    [address], [address]  [place] [id number] [phone]              Problem List              ICD-10-CM Priority Class Noted - Resolved    Palpitations R00.2   2017 - Present    Old MI (myocardial infarction) I25.2   2017 - Present    Essential hypertension I10   2017 - " Present      Health Maintenance        Date Due Completion Dates    IMM DTaP/Tdap/Td Vaccine (1 - Tdap) 7/27/1991 ---    PAP SMEAR 7/27/1993 ---    MAMMOGRAM 7/27/2012 ---            Current Immunizations     No immunizations on file.      Below and/or attached are the medications your provider expects you to take. Review all of your home medications and newly ordered medications with your provider and/or pharmacist. Follow medication instructions as directed by your provider and/or pharmacist. Please keep your medication list with you and share with your provider. Update the information when medications are discontinued, doses are changed, or new medications (including over-the-counter products) are added; and carry medication information at all times in the event of emergency situations     Allergies:  PENICILLINS - Rash               Medications  Valid as of: April 25, 2017 - 11:17 AM    Generic Name Brand Name Tablet Size Instructions for use    Lisinopril (Tab) PRINIVIL 20 MG Take 1 Tab by mouth every day.        Potassium Chloride Rocío CR (Tab CR) Kdur 20 MEQ TAKE 1 TABLET BY MOUTH TWICE A DAY        .                 Medicines prescribed today were sent to:     [id number] - [place], [place] - [address] [place] [address]    Phone: [phone] Fax: [phone]    Open 24 Hours?: No      Medication refill instructions:       If your prescription bottle indicates you have medication refills left, it is not necessary to call your provider’s office. Please contact your pharmacy and they will refill your medication.    If your prescription bottle indicates you do not have any refills left, you may request refills at any time through one of the following ways: The online [employer] system (except Urgent Care), by calling your provider’s office, or by asking your pharmacy to contact your provider’s office with a refill request. Medication refills are processed only during regular business  hours and may not be available until the next business day. Your provider may request additional information or to have a follow-up visit with you prior to refilling your medication.   *Please Note: Medication refills are assigned a new Rx number when refilled electronically. Your pharmacy may indicate that no refills were authorized even though a new prescription for the same medication is available at the pharmacy. Please request the medicine by name with the pharmacy before contacting your provider for a refill.        Your To Do List     Future Labs/Procedures Complete By Expires    BASIC METABOLIC PANEL  As directed 4/25/2018    HOLTER MONITOR STUDY  As directed 4/25/2018         [employer] Access Code: Activation code not generated  Current [employer] Status: Active

## 2017-05-04 ENCOUNTER — NON-PROVIDER VISIT (OUTPATIENT)
Dept: CARDIOLOGY | Facility: MEDICAL CENTER | Age: 47
End: 2017-05-04
Payer: COMMERCIAL

## 2017-05-04 DIAGNOSIS — I49.1 PREMATURE ATRIAL CONTRACTION: ICD-10-CM

## 2017-05-04 DIAGNOSIS — R00.0 HEART RATE FAST: ICD-10-CM

## 2017-05-04 DIAGNOSIS — R06.09 DOE (DYSPNEA ON EXERTION): ICD-10-CM

## 2017-05-11 DIAGNOSIS — R06.09 DOE (DYSPNEA ON EXERTION): ICD-10-CM

## 2017-05-11 DIAGNOSIS — R00.0 HEART RATE FAST: ICD-10-CM

## 2017-05-12 LAB — EKG IMPRESSION: NORMAL

## 2017-05-12 PROCEDURE — 93224 XTRNL ECG REC UP TO 48 HRS: CPT | Performed by: INTERNAL MEDICINE

## 2017-05-24 DIAGNOSIS — Z01.812 PRE-OPERATIVE LABORATORY EXAMINATION: ICD-10-CM

## 2017-05-24 LAB
APPEARANCE UR: CLEAR
B-HCG SERPL-ACNC: 1.1 MIU/ML (ref 0–5)
BASOPHILS # BLD AUTO: 0.3 % (ref 0–1.8)
BASOPHILS # BLD: 0.02 K/UL (ref 0–0.12)
BILIRUB UR QL STRIP.AUTO: NEGATIVE
COLOR UR: NORMAL
CULTURE IF INDICATED INDCX: NO UA CULTURE
EOSINOPHIL # BLD AUTO: 0.15 K/UL (ref 0–0.51)
EOSINOPHIL NFR BLD: 1.9 % (ref 0–6.9)
ERYTHROCYTE [DISTWIDTH] IN BLOOD BY AUTOMATED COUNT: 42.5 FL (ref 35.9–50)
GLUCOSE UR STRIP.AUTO-MCNC: NEGATIVE MG/DL
HCT VFR BLD AUTO: 40 % (ref 37–47)
HGB BLD-MCNC: 13.2 G/DL (ref 12–16)
IMM GRANULOCYTES # BLD AUTO: 0.04 K/UL (ref 0–0.11)
IMM GRANULOCYTES NFR BLD AUTO: 0.5 % (ref 0–0.9)
KETONES UR STRIP.AUTO-MCNC: NEGATIVE MG/DL
LEUKOCYTE ESTERASE UR QL STRIP.AUTO: NEGATIVE
LYMPHOCYTES # BLD AUTO: 2.25 K/UL (ref 1–4.8)
LYMPHOCYTES NFR BLD: 28.8 % (ref 22–41)
MCH RBC QN AUTO: 31 PG (ref 27–33)
MCHC RBC AUTO-ENTMCNC: 33 G/DL (ref 33.6–35)
MCV RBC AUTO: 93.9 FL (ref 81.4–97.8)
MICRO URNS: NORMAL
MONOCYTES # BLD AUTO: 0.5 K/UL (ref 0–0.85)
MONOCYTES NFR BLD AUTO: 6.4 % (ref 0–13.4)
NEUTROPHILS # BLD AUTO: 4.85 K/UL (ref 2–7.15)
NEUTROPHILS NFR BLD: 62.1 % (ref 44–72)
NITRITE UR QL STRIP.AUTO: NEGATIVE
NRBC # BLD AUTO: 0 K/UL
NRBC BLD AUTO-RTO: 0 /100 WBC
PH UR STRIP.AUTO: 7 [PH]
PLATELET # BLD AUTO: 229 K/UL (ref 164–446)
PMV BLD AUTO: 11 FL (ref 9–12.9)
PROT UR QL STRIP: NEGATIVE MG/DL
RBC # BLD AUTO: 4.26 M/UL (ref 4.2–5.4)
RBC UR QL AUTO: NEGATIVE
SP GR UR STRIP.AUTO: 1
WBC # BLD AUTO: 7.8 K/UL (ref 4.8–10.8)

## 2017-05-24 PROCEDURE — 36415 COLL VENOUS BLD VENIPUNCTURE: CPT

## 2017-05-24 PROCEDURE — 84702 CHORIONIC GONADOTROPIN TEST: CPT

## 2017-05-24 PROCEDURE — 81003 URINALYSIS AUTO W/O SCOPE: CPT

## 2017-05-24 PROCEDURE — 85025 COMPLETE CBC W/AUTO DIFF WBC: CPT

## 2017-05-24 RX ORDER — LISINOPRIL 20 MG/1
40 TABLET ORAL EVERY MORNING
COMMUNITY

## 2017-05-25 ENCOUNTER — HOSPITAL ENCOUNTER (OUTPATIENT)
Facility: MEDICAL CENTER | Age: 47
End: 2017-05-25
Attending: OBSTETRICS & GYNECOLOGY
Payer: COMMERCIAL

## 2017-05-25 PROCEDURE — 81001 URINALYSIS AUTO W/SCOPE: CPT

## 2017-05-26 LAB
APPEARANCE UR: ABNORMAL
BILIRUB UR QL STRIP.AUTO: NEGATIVE
COLOR UR: YELLOW
CULTURE IF INDICATED INDCX: NO UA CULTURE
EPI CELLS #/AREA URNS HPF: NORMAL /HPF
GLUCOSE UR STRIP.AUTO-MCNC: NEGATIVE MG/DL
KETONES UR STRIP.AUTO-MCNC: NEGATIVE MG/DL
LEUKOCYTE ESTERASE UR QL STRIP.AUTO: NEGATIVE
MICRO URNS: ABNORMAL
NITRITE UR QL STRIP.AUTO: NEGATIVE
PH UR STRIP.AUTO: 6 [PH]
PROT UR QL STRIP: NEGATIVE MG/DL
RBC # URNS HPF: NORMAL /HPF
RBC UR QL AUTO: NEGATIVE
SP GR UR STRIP.AUTO: 1.01
WBC #/AREA URNS HPF: NORMAL /HPF

## 2017-06-02 ENCOUNTER — HOSPITAL ENCOUNTER (OUTPATIENT)
Facility: MEDICAL CENTER | Age: 47
End: 2017-06-02
Attending: OBSTETRICS & GYNECOLOGY | Admitting: OBSTETRICS & GYNECOLOGY
Payer: COMMERCIAL

## 2017-06-02 VITALS
TEMPERATURE: 98 F | HEIGHT: 69 IN | WEIGHT: 242.51 LBS | RESPIRATION RATE: 12 BRPM | SYSTOLIC BLOOD PRESSURE: 146 MMHG | BODY MASS INDEX: 35.92 KG/M2 | HEART RATE: 81 BPM | DIASTOLIC BLOOD PRESSURE: 86 MMHG | OXYGEN SATURATION: 100 %

## 2017-06-02 PROBLEM — Z30.2 STERILIZATION: Status: RESOLVED | Noted: 2017-06-02 | Resolved: 2017-06-02

## 2017-06-02 PROBLEM — Z30.2 STERILIZATION: Status: ACTIVE | Noted: 2017-06-02

## 2017-06-02 LAB
ANION GAP SERPL CALC-SCNC: 7 MMOL/L (ref 0–11.9)
BUN SERPL-MCNC: 9 MG/DL (ref 8–22)
CALCIUM SERPL-MCNC: 9.5 MG/DL (ref 8.5–10.5)
CHLORIDE SERPL-SCNC: 106 MMOL/L (ref 96–112)
CO2 SERPL-SCNC: 25 MMOL/L (ref 20–33)
CREAT SERPL-MCNC: 0.67 MG/DL (ref 0.5–1.4)
GFR SERPL CREATININE-BSD FRML MDRD: >60 ML/MIN/1.73 M 2
GLUCOSE SERPL-MCNC: 105 MG/DL (ref 65–99)
HCG SERPL QL: NEGATIVE
POTASSIUM SERPL-SCNC: 3.9 MMOL/L (ref 3.6–5.5)
SODIUM SERPL-SCNC: 138 MMOL/L (ref 135–145)

## 2017-06-02 PROCEDURE — 501688 HCHG UTERINE MANIPULATOR RUMI: Performed by: OBSTETRICS & GYNECOLOGY

## 2017-06-02 PROCEDURE — 80048 BASIC METABOLIC PNL TOTAL CA: CPT

## 2017-06-02 PROCEDURE — 502240 HCHG MISC OR SUPPLY RC 0272: Performed by: OBSTETRICS & GYNECOLOGY

## 2017-06-02 PROCEDURE — 160036 HCHG PACU - EA ADDL 30 MINS PHASE I: Performed by: OBSTETRICS & GYNECOLOGY

## 2017-06-02 PROCEDURE — 160009 HCHG ANES TIME/MIN: Performed by: OBSTETRICS & GYNECOLOGY

## 2017-06-02 PROCEDURE — 160041 HCHG SURGERY MINUTES - EA ADDL 1 MIN LEVEL 4: Performed by: OBSTETRICS & GYNECOLOGY

## 2017-06-02 PROCEDURE — 700111 HCHG RX REV CODE 636 W/ 250 OVERRIDE (IP)

## 2017-06-02 PROCEDURE — 500886 HCHG PACK, LAPAROSCOPY: Performed by: OBSTETRICS & GYNECOLOGY

## 2017-06-02 PROCEDURE — A4338 INDWELLING CATHETER LATEX: HCPCS | Performed by: OBSTETRICS & GYNECOLOGY

## 2017-06-02 PROCEDURE — A6402 STERILE GAUZE <= 16 SQ IN: HCPCS | Performed by: OBSTETRICS & GYNECOLOGY

## 2017-06-02 PROCEDURE — 160035 HCHG PACU - 1ST 60 MINS PHASE I: Performed by: OBSTETRICS & GYNECOLOGY

## 2017-06-02 PROCEDURE — 160002 HCHG RECOVERY MINUTES (STAT): Performed by: OBSTETRICS & GYNECOLOGY

## 2017-06-02 PROCEDURE — 160029 HCHG SURGERY MINUTES - 1ST 30 MINS LEVEL 4: Performed by: OBSTETRICS & GYNECOLOGY

## 2017-06-02 PROCEDURE — 700101 HCHG RX REV CODE 250

## 2017-06-02 PROCEDURE — 84703 CHORIONIC GONADOTROPIN ASSAY: CPT

## 2017-06-02 PROCEDURE — 160048 HCHG OR STATISTICAL LEVEL 1-5: Performed by: OBSTETRICS & GYNECOLOGY

## 2017-06-02 PROCEDURE — 501838 HCHG SUTURE GENERAL: Performed by: OBSTETRICS & GYNECOLOGY

## 2017-06-02 RX ORDER — MIDAZOLAM HYDROCHLORIDE 1 MG/ML
INJECTION INTRAMUSCULAR; INTRAVENOUS
Status: DISCONTINUED
Start: 2017-06-02 | End: 2017-06-02 | Stop reason: HOSPADM

## 2017-06-02 RX ORDER — ONDANSETRON 2 MG/ML
4 INJECTION INTRAMUSCULAR; INTRAVENOUS EVERY 6 HOURS PRN
Status: DISCONTINUED | OUTPATIENT
Start: 2017-06-02 | End: 2017-06-02 | Stop reason: HOSPADM

## 2017-06-02 RX ORDER — BUPIVACAINE HYDROCHLORIDE AND EPINEPHRINE 2.5; 5 MG/ML; UG/ML
INJECTION, SOLUTION EPIDURAL; INFILTRATION; INTRACAUDAL; PERINEURAL
Status: DISCONTINUED | OUTPATIENT
Start: 2017-06-02 | End: 2017-06-02 | Stop reason: HOSPADM

## 2017-06-02 RX ORDER — SODIUM CHLORIDE, SODIUM LACTATE, POTASSIUM CHLORIDE, CALCIUM CHLORIDE 600; 310; 30; 20 MG/100ML; MG/100ML; MG/100ML; MG/100ML
1000 INJECTION, SOLUTION INTRAVENOUS
Status: COMPLETED | OUTPATIENT
Start: 2017-06-02 | End: 2017-06-02

## 2017-06-02 RX ADMIN — SODIUM CHLORIDE, SODIUM LACTATE, POTASSIUM CHLORIDE, CALCIUM CHLORIDE 1000 ML: 600; 310; 30; 20 INJECTION, SOLUTION INTRAVENOUS at 06:30

## 2017-06-02 ASSESSMENT — PAIN SCALES - GENERAL
PAINLEVEL_OUTOF10: 0

## 2017-06-02 NOTE — OP REPORT
DATE OF SERVICE:  06/02/2017    DATE OF OPERATION:  06/02/2017    ATTENDING PHYSICIAN:  Ryan Weeks MD    PREOPERATIVE DIAGNOSIS:  Desires sterilization.    POSTOPERATIVE DIAGNOSES:  1.  Desires sterilization.  2.  Large uterine fundal myoma.    OPERATIVE PROCEDURE:  Laparoscopic tubal fulguration.    FIRST SURGEON:  Ryan Weeks MD.    ANESTHESIOLOGIST:  Gloria Fajardo MD    ANESTHESIA:  General.    DESCRIPTION OF PROCEDURE:  Patient was prepped and draped in the usual sterile   fashion in the dorsal lithotomy position under general anesthesia.  Exam   under anesthesia revealed the uterus to be difficult to palpate, felt to be   mid zone without adnexal masses.  [place] speculum was placed in the vagina.    The anterior lip of the cervix grasped with a single-tooth tenaculum and the   endometrial cavity sounded to 8 cm.  The cervix dilated up to a #18 Hanks   dilator and a #8 ANAND uterine elevator inserted into the endometrial cavity   and insufflated with saline for traction.  The speculum and tenaculum were   removed.    Subumbilical incision was made sharply with a knife after injection with 0.25%   Marcaine with epinephrine.  An 18 mL was used.  Dissection was carried down   to the fascia with the S retractors and the fascia grasped with a Kocher.  The   fascia was sharply incised transversely and the lateral borders tagged with 0   Vicryl suture.  The peritoneum was entered with a fingertip.  S retractors   were placed and the disposable inflatable trocar was inserted, inflated with   10 mL of air and secured with the 0 Vicryl sutures.  Peritoneal cavity was   insufflated with carbon dioxide gas under pressure not exceeding 16.  The   scope was inserted, the pelvis inspected.    FINDINGS:  The anterior cul-de-sac was clean.  The uterus was top normal size.    There was a fundal myoma, which was nearly as large as the uterus.  Both   tubes and ovaries were normal bilaterally.  Posterior cul-de-sac was  clean.    With the Kleppinger on a power setting of generator setting of 50, we   cauterized the distal isthmic proximal ampullary portion of the right   fallopian tube over a 3 cm segment, segment utilizing the ammeter with   resistance going to zero each time.  Identical procedure was performed on the   left, both with good hemostasis and good visualization.  The photos were   taken.  The instruments were removed from the abdomen allowing the excess   carbon dioxide to escape and the fascial incision reapproximated with   interrupted 0 Vicryl figure-of-eight sutures.  The skin with 4-0 subcuticular   Vicryl sutures and sterile hemostatic dressing applied.  Instruments removed   from the vagina with adequate hemostasis.    BLOOD LOSS:  5 mL    NEEDLE AND SPONGE COUNTS:  Correct.    Patient tolerated the procedure well and was transferred to postop in good   condition.       ____________________________________     MD ANA M SAXENA / CHRISTIANO    DD:  06/02/2017 08:26:45  DT:  06/02/2017 08:42:13    D#:  [id number]  Job#:  [id number]

## 2017-06-02 NOTE — OR SURGEON
Immediate Post-Operative Note      PreOp Diagnosis: desires sterilization    PostOp Diagnosis: mo    Procedure(s):  TUBAL COAGULATION LAPAROSCOPIC BILATERAL - Wound Class: Clean Contaminated    Surgeon(s):  Ryan Weeks M.D.    Anesthesiologist/Type of Anesthesia:  Anesthesiologist: Gloria Fajardo M.D./General    Surgical Staff:  Circulator: Abril Mendes R.N.  Scrub Person: Beverly Gallegoson    Specimen: none    Estimated Blood Loss: 5cc    Findings: large fundal uterine myoma, utx tns. Nl tubes and ovaries.    Complications: none        6/2/2017 8:14 AM Ryan Weeks

## 2017-06-02 NOTE — IP AVS SNAPSHOT
" Home Care Instructions                                                                                                                Name:Jyoti Gutierrez  Medical Record Number:[id number]  CSN: [id number]    YOB: 1970   Age: 46 y.o.  Sex: female  HT:1.753 m (5' 9\") WT: 110 kg (242 lb 8.1 oz)          Admit Date: 6/2/2017     Discharge Date:   Today's Date: 6/2/2017  Attending Doctor:  Ryan Weeks M.D.                  Allergies:  Losartan; Macrobid; Septra; and Penicillins              Follow-up Information     1. Follow up with Ryan Weeks M.D. In 2 weeks.    Specialty:  OB/Gyn    Why:  sooner, As needed    Contact information    [address] [place] [address] [address]  [place] [address] [phone]          Discharge Instructions         ACTIVITY: Rest and take it easy for the first 24 hours.  A responsible adult is recommended to remain with you during that time.  It is normal to feel sleepy.  We encourage you to not do anything that requires balance, judgment or coordination.    MILD FLU-LIKE SYMPTOMS ARE NORMAL. YOU MAY EXPERIENCE GENERALIZED MUSCLE ACHES, THROAT IRRITATION, HEADACHE AND/OR SOME NAUSEA.    FOR 24 HOURS DO NOT:  Drive, operate machinery or run household appliances.  Drink beer or alcoholic beverages.   Make important decisions or sign legal documents.    SPECIAL INSTRUCTIONS: Pelvic rest 2 weeks  See attached instruction sheet regarding Laparoscopic Tubal    DIET: To avoid nausea, slowly advance diet as tolerated, avoiding spicy or greasy foods for the first day.  Add more substantial food to your diet according to your physician's instructions.  Babies can be fed formula or breast milk as soon as they are hungry.  INCREASE FLUIDS AND FIBER TO AVOID CONSTIPATION.    SURGICAL DRESSING/BATHING: *May shower tomorrow, but no showers, baths or swimming until approved by your physician.     FOLLOW-UP APPOINTMENT:  A follow-up appointment should be arranged with your doctor, call to " schedule.    You should CALL YOUR PHYSICIAN if you develop:  Fever greater than 101 degrees F.  Pain not relieved by medication, or persistent nausea or vomiting.  Excessive bleeding (blood soaking through dressing) or unexpected drainage from the wound.  Extreme redness or swelling around the incision site, drainage of pus or foul smelling drainage.  Inability to urinate or empty your bladder within 8 hours.  Problems with breathing or chest pain.    You should call 911 if you develop problems with breathing or chest pain.  If you are unable to contact your doctor or surgical center, you should go to the nearest emergency room or urgent care center.  Physician's telephone #: Dr. Weeks [phone]    If any questions arise, call your doctor.  If your doctor is not available, please feel free to call the Surgical Center at [phone].  The Center is open Monday through Friday from 7AM to 7PM.  You can also call the [employer] HOTLINE open 24 hours/day, 7 days/week and speak to a nurse at [phone], or toll free at [phone].    A registered nurse may call you a few days after your surgery to see how you are doing after your procedure.    MEDICATIONS: Resume taking daily medication.  Take prescribed pain medication with food.  If no medication is prescribed, you may take non-aspirin pain medication if needed.  PAIN MEDICATION CAN BE VERY CONSTIPATING.  Take a stool softener or laxative such as senokot, pericolace, or milk of magnesia if needed.    Prescription given for *Has at home**.  Last pain medication given at __________    If your physician has prescribed pain medication that includes Acetaminophen (Tylenol), do not take additional Acetaminophen (Tylenol) while taking the prescribed medication.    Depression / Suicide Risk    As you are discharged from this [facility] facility, it is important to learn how to keep safe from harming yourself.    Recognize the warning signs:  · Abrupt changes in  personality, positive or negative- including increase in energy   · Giving away possessions  · Change in eating patterns- significant weight changes-  positive or negative  · Change in sleeping patterns- unable to sleep or sleeping all the time   · Unwillingness or inability to communicate  · Depression  · Unusual sadness, discouragement and loneliness  · Talk of wanting to die  · Neglect of personal appearance   · Rebelliousness- reckless behavior  · Withdrawal from people/activities they love  · Confusion- inability to concentrate     If you or a loved one observes any of these behaviors or has concerns about self-harm, here's what you can do:  · Talk about it- your feelings and reasons for harming yourself  · Remove any means that you might use to hurt yourself (examples: pills, rope, extension cords, firearm)  · Get professional help from the community (Mental Health, Substance Abuse, psychological counseling)  · Do not be alone:Call your Safe Contact- someone whom you trust who will be there for you.  · Call your local CRISIS HOTLINE 171-9356 or 345-475-6379  · Call your local Children's Mobile Crisis Response Team [place] (071) 169-9045 or wwwQinti  · Call the toll free National Suicide Prevention Hotlines   · National Suicide Prevention Lifeline 280-183-ALZV (6933)  · National Hope Line Network 800-SUICIDE (845-6580)       Medication List      CONTINUE taking these medications        Instructions    Morning Afternoon Evening Bedtime    lisinopril 20 MG Tabs   Commonly known as:  PRINIVIL        Take 40 mg by mouth every morning.   Dose:  40 mg                        NON SPECIFIED        doterra vitamins 12 capsules daily                        XANAX PO        Take  by mouth as needed. Pt unsure of dose                                Medication Information     Above and/or attached are the medications your physician expects you to take upon discharge. Review all of your home medications and  newly ordered medications with your doctor and/or pharmacist. Follow medication instructions as directed by your doctor and/or pharmacist. Please keep your medication list with you and share with your physician. Update the information when medications are discontinued, doses are changed, or new medications (including over-the-counter products) are added; and carry medication information at all times in the event of emergency situations.        Resources     Quit Smoking / Tobacco Use:    I understand the use of any tobacco products increases my chance of suffering from future heart disease or stroke and could cause other illnesses which may shorten my life. Quitting the use of tobacco products is the single most important thing I can do to improve my health. For further information on smoking / tobacco cessation call a Toll Free Quit Line at 1-795.529.2705 (*[employer] [place]) or 1-618.215.7303 (American Lung Association) or you can access the web based program at www.lungusa.org.    [place] Tobacco Users Help Line:  (235) 354-4620       Toll Free: 1-526.109.2211  Quit Tobacco Program [facility] (636)073-2506    Crisis Hotline:    [place] Crisis Hotline:  2-169-NZFRQVU or 1-470.760.5311    [place] Crisis Hotline:    1-705.293.2051 or 169-710-0835    Discharge Survey:   Thank you for choosing [facility]. We hope we did everything we could to make your hospital stay a pleasant one. You may be receiving a survey and we would appreciate your time and participation in answering the questions. Your input is very valuable to us in our efforts to improve our service to our patients and their families.            Signatures     My signature on this form indicates that:    1. I acknowledge receipt and understanding of these Home Care Instruction.  2. My questions regarding this information have been answered to my satisfaction.  3. I have formulated a plan with my discharge nurse to obtain my  prescribed medications for home.    __________________________________      __________________________________                   Patient Signature                                 Guardian/Responsible Adult Signature      __________________________________                 __________       ________                       Nurse Signature                                               Date                 Time

## 2017-06-02 NOTE — OR NURSING
0820 Received from OR, report from Dr. Fajardo.  Dayanara pad CDI and abdomen dressing CDI, abdomen soft.      0841 Pt sleeping appears comfortable. Daughter at bedside.    0911 Pt is eating otter pop.  Pt declines pain or pain medication at this time.     0915 Dc instructions completed with PT and her daughter.      0928 dc to car via wheel chair.  Pt has all belongings.

## 2017-06-02 NOTE — IP AVS SNAPSHOT
6/2/2017    Jyoti Gutierrez  [address] [place] [address]    Dear Jyoti:    [facility] wants to ensure your discharge home is safe and you or your loved ones have had all of your questions answered regarding your care after you leave the hospital.    Below is a list of resources and contact information should you have any questions regarding your hospital stay, follow-up instructions, or active medical symptoms.    Questions or Concerns Regarding… Contact   Medical Questions Related to Your Discharge  (7 days a week, 8am-5pm) Contact a Nurse Care Coordinator   [phone]   Medical Questions Not Related to Your Discharge  (24 hours a day / 7 days a week)  Contact the Nurse Health Line   [phone]    Medications or Discharge Instructions Refer to your discharge packet   or contact your [facility] Primary Care Provider   [phone]   Follow-up Appointment(s) Schedule your appointment via [employer]   or contact Scheduling [phone]   Billing Review your statement via Prezi  or contact Billing [phone]   Medical Records Review your records via Prezi   or contact Medical Records [phone]     You may receive a telephone call within two days of discharge. This call is to make certain you understand your discharge instructions and have the opportunity to have any questions answered. You can also easily access your medical information, test results and upcoming appointments via the Prezi free online health management tool. You can learn more and sign up at [employer]/Prezi. For assistance setting up your Prezi account, please call [phone].    Once again, we want to ensure your discharge home is safe and that you have a clear understanding of any next steps in your care. If you have any questions or concerns, please do not hesitate to contact us, we are here for you. Thank you for choosing [facility] for your healthcare needs.    Sincerely,    Your [facility] Healthcare Team

## 2017-06-02 NOTE — DISCHARGE INSTRUCTIONS
ACTIVITY: Rest and take it easy for the first 24 hours.  A responsible adult is recommended to remain with you during that time.  It is normal to feel sleepy.  We encourage you to not do anything that requires balance, judgment or coordination.    MILD FLU-LIKE SYMPTOMS ARE NORMAL. YOU MAY EXPERIENCE GENERALIZED MUSCLE ACHES, THROAT IRRITATION, HEADACHE AND/OR SOME NAUSEA.    FOR 24 HOURS DO NOT:  Drive, operate machinery or run household appliances.  Drink beer or alcoholic beverages.   Make important decisions or sign legal documents.    SPECIAL INSTRUCTIONS: Pelvic rest 2 weeks  See attached instruction sheet regarding Laparoscopic Tubal    DIET: To avoid nausea, slowly advance diet as tolerated, avoiding spicy or greasy foods for the first day.  Add more substantial food to your diet according to your physician's instructions.  Babies can be fed formula or breast milk as soon as they are hungry.  INCREASE FLUIDS AND FIBER TO AVOID CONSTIPATION.    SURGICAL DRESSING/BATHING: *May shower tomorrow, but no showers, baths or swimming until approved by your physician.     FOLLOW-UP APPOINTMENT:  A follow-up appointment should be arranged with your doctor, call to schedule.    You should CALL YOUR PHYSICIAN if you develop:  Fever greater than 101 degrees F.  Pain not relieved by medication, or persistent nausea or vomiting.  Excessive bleeding (blood soaking through dressing) or unexpected drainage from the wound.  Extreme redness or swelling around the incision site, drainage of pus or foul smelling drainage.  Inability to urinate or empty your bladder within 8 hours.  Problems with breathing or chest pain.    You should call 911 if you develop problems with breathing or chest pain.  If you are unable to contact your doctor or surgical center, you should go to the nearest emergency room or urgent care center.  Physician's telephone #: Dr. Weeks [phone]    If any questions arise, call your doctor.  If your  doctor is not available, please feel free to call the Surgical Center at [phone].  The Center is open Monday through Friday from 7AM to 7PM.  You can also call the HEALTH HOTLINE open 24 hours/day, 7 days/week and speak to a nurse at [phone], or toll free at [phone].    A registered nurse may call you a few days after your surgery to see how you are doing after your procedure.    MEDICATIONS: Resume taking daily medication.  Take prescribed pain medication with food.  If no medication is prescribed, you may take non-aspirin pain medication if needed.  PAIN MEDICATION CAN BE VERY CONSTIPATING.  Take a stool softener or laxative such as senokot, pericolace, or milk of magnesia if needed.    Prescription given for *Has at home**.  Last pain medication given at __________    If your physician has prescribed pain medication that includes Acetaminophen (Tylenol), do not take additional Acetaminophen (Tylenol) while taking the prescribed medication.    Depression / Suicide Risk    As you are discharged from this [facility] facility, it is important to learn how to keep safe from harming yourself.    Recognize the warning signs:  · Abrupt changes in personality, positive or negative- including increase in energy   · Giving away possessions  · Change in eating patterns- significant weight changes-  positive or negative  · Change in sleeping patterns- unable to sleep or sleeping all the time   · Unwillingness or inability to communicate  · Depression  · Unusual sadness, discouragement and loneliness  · Talk of wanting to die  · Neglect of personal appearance   · Rebelliousness- reckless behavior  · Withdrawal from people/activities they love  · Confusion- inability to concentrate     If you or a loved one observes any of these behaviors or has concerns about self-harm, here's what you can do:  · Talk about it- your feelings and reasons for harming yourself  · Remove any means that you might use to hurt  yourself (examples: pills, rope, extension cords, firearm)  · Get professional help from the community (Mental Health, Substance Abuse, psychological counseling)  · Do not be alone:Call your Safe Contact- someone whom you trust who will be there for you.  · Call your local CRISIS HOTLINE 308-5274 or 331-381-2490  · Call your local Children's Mobile Crisis Response Team [place] (306) 827-9979 or www.Altruja  · Call the toll free National Suicide Prevention Hotlines   · National Suicide Prevention Lifeline 560-288-NNVJ (9117)  · National Hope Line Network 800-SUICIDE (280-9667)

## (undated) DEVICE — MASK ANESTHESIA ADULT  - (100/CA)

## (undated) DEVICE — PACK LAPAROSCOPY - (1/CA)

## (undated) DEVICE — SPONGE GAUZESTER. 2X2 4-PL - (2/PK 50PK/BX 30BX/CS)

## (undated) DEVICE — GOWN WARMING STANDARD FLEX - (30/CA)

## (undated) DEVICE — SUCTION INSTRUMENT YANKAUER BULBOUS TIP W/O VENT (50EA/CA)

## (undated) DEVICE — GLOVE BIOGEL SZ 7.5 SURGICAL PF LTX - (50PR/BX 4BX/CA)

## (undated) DEVICE — UTERINE MANIP RUMI 6.7X8 - (5/BX)

## (undated) DEVICE — TROCAR LAPSCP 100MM 12MM NTHRD - (6/BX)

## (undated) DEVICE — KIT ANESTHESIA W/CIRCUIT & 3/LT BAG W/FILTER (20EA/CA)

## (undated) DEVICE — HEAD HOLDER JUNIOR/ADULT

## (undated) DEVICE — KIT  I.V. START (100EA/CA)

## (undated) DEVICE — CATHETER IV 20 GA X 1-1/4 ---SURG.& SDS ONLY--- (50EA/BX)

## (undated) DEVICE — CANISTER SUCTION RIGID RED 1500CC (40EA/CA)

## (undated) DEVICE — NEPTUNE 4 PORT MANIFOLD - (20/PK)

## (undated) DEVICE — ARMREST CRADLE FOAM - (2PR/PK 12PR/CA)

## (undated) DEVICE — CANISTER SUCTION 3000ML MECHANICAL FILTER AUTO SHUTOFF MEDI-VAC NONSTERILE LF DISP  (40EA/CA)

## (undated) DEVICE — TUBE E-T HI-LO CUFF 7.0MM (10EA/PK)

## (undated) DEVICE — ELECTRODE 850 FOAM ADHESIVE - HYDROGEL RADIOTRNSPRNT (50/PK)

## (undated) DEVICE — TUBE CONNECTING SUCTION - CLEAR PLASTIC STERILE 72 IN (50EA/CA)

## (undated) DEVICE — TUBING CLEARLINK DUO-VENT - C-FLO (48EA/CA)

## (undated) DEVICE — TUBING SETDISPOS HIGH FLOW II - (10/BX)

## (undated) DEVICE — SODIUM CHL IRRIGATION 0.9% 1000ML (12EA/CA)

## (undated) DEVICE — UTERINE MANIP RUMI 6.7X10 - (5/BX)

## (undated) DEVICE — SUTURE GENERAL

## (undated) DEVICE — PAD SANITARY 11IN MAXI IND WRAPPED  (12EA/PK 24PK/CA)

## (undated) DEVICE — TRAY SRGPRP PVP IOD WT PRP - (20/CA)

## (undated) DEVICE — SET LEADWIRE 5 LEAD BEDSIDE DISPOSABLE ECG (1SET OF 5/EA)

## (undated) DEVICE — LEAD SET 6 DISP. EKG NIHON KOHDEN (100EA/CA)

## (undated) DEVICE — SUTURE 4-0 VICRYL PLUS FS-2 - 27 INCH (36/BX)

## (undated) DEVICE — SUTURE 0 VICRYL PLUS CT-2 - 27 INCH (36/BX)

## (undated) DEVICE — GLOVE BIOGEL PI INDICATOR SZ 7.0 SURGICAL PF LF - (50/BX 4BX/CA)

## (undated) DEVICE — SENSOR SPO2 NEO LNCS ADHESIVE (20/BX) SEE USER NOTES

## (undated) DEVICE — LACTATED RINGERS INJ 1000 ML - (14EA/CA 60CA/PF)

## (undated) DEVICE — TRAY FOLEY CATHETER STATLOCK 16FR SURESTEP  (10EA/CA)

## (undated) DEVICE — DRESSING TRANSPARENT FILM TEGADERM 2.375 X 2.75"  (100EA/BX)"

## (undated) DEVICE — SLEEVE, VASO, THIGH, MED